# Patient Record
(demographics unavailable — no encounter records)

---

## 2025-05-12 NOTE — ASSESSMENT
[FreeTextEntry1] : Impression: Symptomatic arthritis right shoulder.  This patient has been injected uneventfully into the right shoulder with methylprednisolone and lidocaine.  Continue with Tylenol for discomfort she will return on appearing basis

## 2025-05-12 NOTE — PHYSICAL EXAM
[de-identified] : On examination today she has reasonable motion to the cervical spine with no significant spasm or tenderness present.  The right shoulder she has painful motion with mild to moderate restriction in abduction and forward flexion as well as rotation mainly external.  She does have mild crepitus on passive motion there is no obvious instability on stress neurovascular exam of the extremity is unremarkable.  X-rays ordered and taken today 2 views of the right shoulder reviewed interpreted by myself reveal no significant interval change with regards to the mild arthritic changes present no lesion

## 2025-05-12 NOTE — HISTORY OF PRESENT ILLNESS
[de-identified] : This 85-year-old returns today after long hiatus because of recurrent right shoulder pain and stiffness.  No new injuries.  She has not had any radiation of the pain distally she does note crepitus when she moves.  Her general health has been stable since last seen

## 2025-05-12 NOTE — PROCEDURE
[FreeTextEntry1] : After appropriate timeout a consent was obtained.  Under sterile technique with a Betadine prep the subacromial space of the right shoulder was injected with 40 mg of methylprednisolone and 5 cc of 1% lidocaine with a Band-Aid dressing applied at the conclusion this was tolerated without incident